# Patient Record
Sex: MALE | Race: WHITE | Employment: FULL TIME | URBAN - NONMETROPOLITAN AREA
[De-identification: names, ages, dates, MRNs, and addresses within clinical notes are randomized per-mention and may not be internally consistent; named-entity substitution may affect disease eponyms.]

---

## 2021-04-04 ENCOUNTER — HOSPITAL ENCOUNTER (EMERGENCY)
Age: 36
Discharge: HOME | End: 2021-04-04
Attending: FAMILY MEDICINE
Payer: COMMERCIAL

## 2021-04-04 ENCOUNTER — APPOINTMENT (OUTPATIENT)
Dept: GENERAL RADIOLOGY | Age: 36
End: 2021-04-04
Payer: COMMERCIAL

## 2021-04-04 VITALS
HEIGHT: 70 IN | BODY MASS INDEX: 37.94 KG/M2 | SYSTOLIC BLOOD PRESSURE: 147 MMHG | HEART RATE: 91 BPM | RESPIRATION RATE: 18 BRPM | TEMPERATURE: 98.8 F | DIASTOLIC BLOOD PRESSURE: 90 MMHG | OXYGEN SATURATION: 97 % | WEIGHT: 265 LBS

## 2021-04-04 DIAGNOSIS — S93.401A SPRAIN OF RIGHT ANKLE, UNSPECIFIED LIGAMENT, INITIAL ENCOUNTER: Primary | ICD-10-CM

## 2021-04-04 PROCEDURE — 73610 X-RAY EXAM OF ANKLE: CPT

## 2021-04-04 PROCEDURE — 73630 X-RAY EXAM OF FOOT: CPT

## 2021-04-04 PROCEDURE — 99283 EMERGENCY DEPT VISIT LOW MDM: CPT

## 2021-04-04 RX ORDER — IBUPROFEN 600 MG/1
600 TABLET ORAL 4 TIMES DAILY PRN
Qty: 30 TABLET | Refills: 0 | Status: SHIPPED | OUTPATIENT
Start: 2021-04-04 | End: 2022-07-05 | Stop reason: ALTCHOICE

## 2021-04-04 ASSESSMENT — ENCOUNTER SYMPTOMS
RESPIRATORY NEGATIVE: 1
GASTROINTESTINAL NEGATIVE: 1
EYES NEGATIVE: 1

## 2021-04-04 ASSESSMENT — PAIN DESCRIPTION - PAIN TYPE: TYPE: ACUTE PAIN

## 2021-04-04 ASSESSMENT — PAIN DESCRIPTION - LOCATION: LOCATION: ANKLE

## 2021-04-04 NOTE — ED PROVIDER NOTES
[facility]  EMERGENCY DEPARTMENT ENCOUNTER      Pt Name: Cathie Parks  MRN: [id number]  [place] 1985  Date of evaluation: 4/4/2021  Provider: Gian Dominguez MD    CHIEF COMPLAINT     Chief Complaint   Patient presents with    Ankle Pain     Right, onset 30mins ago after rolling ankle         HISTORY OF PRESENT ILLNESS   (Location/Symptom, Timing/Onset, Context/Setting,Quality, Duration, Modifying Factors, Severity)  Note limiting factors. Cathie Parks is a35 y.o. male who presents to the emergency department      This is a 37years old male presented to the ER complaining of having rolled his ankle about 2 hours prior to presentation. He states that within a few minutes after that he noticed a big swollen area on the lateral side of his right ankle. He reports the pain to be 7 out of 10 whenever he walks. When he is resting his pain a 2 out of 10. He denies having heard a snap, crack,  or popping sound. Nursing Notes werereviewed. REVIEW OF SYSTEMS    (2-9 systems for level 4, 10 or more for level 5)     Review of Systems   Constitutional: Negative. HENT: Negative. Eyes: Negative. Respiratory: Negative. Cardiovascular: Negative. Gastrointestinal: Negative. Genitourinary: Negative. Musculoskeletal: Positive for joint swelling. Skin: Negative. Neurological: Negative. Except as noted above the remainder of the review of systems was reviewed and negative. PAST MEDICAL HISTORY   History reviewed. No pertinent past medical history. SURGICALHISTORY     History reviewed. No pertinent surgical history. CURRENT MEDICATIONS       There are no discharge medications for this patient. Penicillins    FAMILY HISTORY     History reviewed. No pertinent family history.        SOCIAL HISTORY       Social History     Socioeconomic History    Marital status:      Spouse name: None    Number of children: None    Years of education: None    Highest education level: None   Occupational History    None   Social Needs    Financial resource strain: None    Food insecurity     Worry: None     Inability: None    Transportation needs     Medical: None     Non-medical: None   Tobacco Use    Smoking status: Never Smoker    Smokeless tobacco: Never Used   Substance and Sexual Activity    Alcohol use: None    Drug use: None    Sexual activity: None   Lifestyle    Physical activity     Days per week: None     Minutes per session: None    Stress: None   Relationships    Social connections     Talks on phone: None     Gets together: None     Attends L[employer] day service: None     Active member of club or organization: None     Attends meetings of clubs or organizations: None     Relationship status: None    Intimate partner violence     Fear of current or ex partner: None     Emotionally abused: None     Physically abused: None     Forced sexual activity: None   Other Topics Concern    None   Social History Narrative    None       SCREENINGS                    PHYSICAL EXAM    (up to 7 for level 4, 8 or more for level 5)     ED Triage Vitals   BP Temp Temp src Pulse Resp SpO2 Height Weight   04/04/21 1702 04/04/21 1658 -- 04/04/21 1658 04/04/21 1658 04/04/21 1658 04/04/21 1658 04/04/21 1658   (!) 147/90 98.8 °F (37.1 °C)  91 18 97 % 5' 10\" (1.778 m) 265 lb (120.2 kg)       Physical Exam  Vitals signs and nursing note reviewed. Constitutional:       General: He is not in acute distress. Appearance: Normal appearance. He is obese. He is not ill-appearing, toxic-appearing or diaphoretic. HENT:      Head: Normocephalic and atraumatic. Nose: Nose normal.      Mouth/Throat:      Mouth: Mucous membranes are moist.   Neck:      Musculoskeletal: Normal range of motion. Cardiovascular:      Rate and Rhythm: Normal rate and regular rhythm. Pulses: Normal pulses.    Pulmonary:      Effort: Pulmonary effort is normal. Breath sounds: Normal breath sounds. Musculoskeletal:         General: Swelling, tenderness and signs of injury present. No deformity. Right lower leg: No edema. Left lower leg: No edema. Skin:     General: Skin is warm. Capillary Refill: Capillary refill takes less than 2 seconds. Coloration: Skin is not jaundiced or pale. Findings: Bruising present. No erythema, lesion or rash. Neurological:      General: No focal deficit present. Mental Status: He is alert. DIAGNOSTIC RESULTS     EKG: All EKG's are interpreted by the Emergency Department Physician who either signs orCo-signs this chart in the absence of a cardiologist.        RADIOLOGY:   plain film images such as CT, Ultrasound and MRI are read by the radiologist. Plain radiographic images are visualized and preliminarily interpreted by the emergency physician with the below findings:        Interpretation per the Radiologist below, ifavailable at the time of this note:    XR ANKLE RIGHT (MIN 3 VIEWS)   Final Result   There is an accessory ossicle adjacent to the lateral side of calcaneus. The   accessory ossicle is only visualized in one view and appears to have a   bipartite appearance. However if this area corresponds to site of maximal   tenderness the finding may be related to acute fracture. No other acute osseous abnormality. Moderate soft tissue swelling adjacent   to the lateral malleolus. XR FOOT RIGHT (MIN 3 VIEWS)   Final Result   There is an accessory ossicle adjacent to the lateral side of calcaneus. The   accessory ossicle is only visualized in one view and appears to have a   bipartite appearance. However if this area corresponds to site of maximal   tenderness the finding may be related to acute fracture. No other acute osseous abnormality. Moderate soft tissue swelling adjacent   to the lateral malleolus.                ED BEDSIDE ULTRASOUND:   Performed by ED Physician - none    LABS:  Labs Reviewed - No data to display    All other labs were within normal range ornot returned as of this dictation. EMERGENCY DEPARTMENT COURSE and DIFFERENTIAL DIAGNOSIS/MDM:   Vitals:    Vitals:    04/04/21 1658 04/04/21 1702   BP:  (!) 147/90   Pulse: 91    Resp: 18    Temp: 98.8 °F (37.1 °C)    SpO2: 97%    Weight: 265 lb (120.2 kg)    Height: 5' 10\" (1.778 m)            MDM  Number of Diagnoses or Management Options  Sprain of right ankle, unspecified ligament, initial encounter  Diagnosis management comments: Patient with appears to be a sprain/strain ankle after rolling his ankle ladder exteriorly. Fortunately he does not have a fracture. He be placed in a walking boot will give instructions on RICE having follow-up with orthopedics in the next 2 3 days. Amount and/or Complexity of Data Reviewed  Tests in the radiology section of CPT®: ordered and reviewed         CRITICAL CARE TIME   Total CriticalCare time was  minutes, excluding separately reportable procedures. There was a high probability of clinically significant/life threatening deterioration in the patient's condition which required my urgent intervention. CONSULTS:  None    PROCEDURES:  Unlessotherwise noted below, none     Procedures    FINAL IMPRESSION      1. Sprain of right ankle, unspecified ligament, initial encounter          DISPOSITION/PLAN   DISPOSITION        PATIENT REFERRED TO:  No follow-up provider specified. DISCHARGE MEDICATIONS:  There are no discharge medications for this patient.              (Please note that portions of this note were completed with a voice recognition program.  Efforts were made to edit the dictations but occasionally words are mis-transcribed.)      Judd Self MD (electronically signed)  Attending Emergency Physician            Judd Self MD  04/04/21 RADHA Locke MD  04/10/21 0036       Judd Self MD  04/11/21 3089